# Patient Record
Sex: FEMALE | Race: BLACK OR AFRICAN AMERICAN | URBAN - METROPOLITAN AREA
[De-identification: names, ages, dates, MRNs, and addresses within clinical notes are randomized per-mention and may not be internally consistent; named-entity substitution may affect disease eponyms.]

---

## 2021-07-16 ENCOUNTER — NEW PATIENT COMPREHENSIVE (OUTPATIENT)
Dept: URBAN - METROPOLITAN AREA CLINIC 50 | Facility: CLINIC | Age: 66
End: 2021-07-16

## 2021-07-16 DIAGNOSIS — H25.13: ICD-10-CM

## 2021-07-16 DIAGNOSIS — H35.373: ICD-10-CM

## 2021-07-16 DIAGNOSIS — H40.1131: ICD-10-CM

## 2021-07-16 PROCEDURE — 76514 ECHO EXAM OF EYE THICKNESS: CPT

## 2021-07-16 PROCEDURE — 92004 COMPRE OPH EXAM NEW PT 1/>: CPT

## 2021-07-16 PROCEDURE — 92134 CPTRZ OPH DX IMG PST SGM RTA: CPT

## 2021-07-16 ASSESSMENT — VISUAL ACUITY
OS_CC: 20/40
OD_GLARE: 20/80
OD_CC: 20/50
OS_GLARE: 20/80
OD_PH: 20/30
OD_GLARE: 20/40
OS_SC: 20/100
OS_GLARE: 20/40
OU_CC: J1
OS_PH: 20/25
OD_SC: 20/50

## 2021-07-16 ASSESSMENT — KERATOMETRY
OS_AXISANGLE2_DEGREES: 153
OS_K2POWER_DIOPTERS: 45.75
OD_K2POWER_DIOPTERS: 45.50
OD_K1POWER_DIOPTERS: 44.75
OD_AXISANGLE_DEGREES: 99
OD_AXISANGLE2_DEGREES: 009
OS_K1POWER_DIOPTERS: 44.00
OS_AXISANGLE_DEGREES: 063

## 2021-07-16 ASSESSMENT — PACHYMETRY
OS_CT_UM: 588
OD_CT_UM: 589

## 2021-07-16 ASSESSMENT — TONOMETRY
OD_IOP_MMHG: 19
OD_IOP_MMHG: 22
OS_IOP_MMHG: 23
OS_IOP_MMHG: 20

## 2021-07-16 NOTE — PATIENT DISCUSSION
Iop in normal range,  recommend getting records from previous ophthalmologist.  once reviewed may need to add a drop.  but at this time will just observe.

## 2022-02-18 ENCOUNTER — ESTABLISHED PATIENT (OUTPATIENT)
Dept: URBAN - METROPOLITAN AREA CLINIC 50 | Facility: CLINIC | Age: 67
End: 2022-02-18

## 2022-02-18 DIAGNOSIS — H35.373: ICD-10-CM

## 2022-02-18 DIAGNOSIS — H25.13: ICD-10-CM

## 2022-02-18 DIAGNOSIS — H40.1131: ICD-10-CM

## 2022-02-18 DIAGNOSIS — E11.9: ICD-10-CM

## 2022-02-18 PROCEDURE — 92134 CPTRZ OPH DX IMG PST SGM RTA: CPT

## 2022-02-18 PROCEDURE — 92014 COMPRE OPH EXAM EST PT 1/>: CPT

## 2022-02-18 ASSESSMENT — VISUAL ACUITY
OD_GLARE: 20/40
OS_CC: 20/25-2
OS_GLARE: 20/25
OD_CC: 20/30
OS_GLARE: 20/40
OD_GLARE: 20/40
OD_PH: 20/25
OU_CC: J1+

## 2022-02-18 ASSESSMENT — KERATOMETRY
OD_K2POWER_DIOPTERS: 45.50
OD_AXISANGLE_DEGREES: 99
OS_AXISANGLE_DEGREES: 063
OD_K1POWER_DIOPTERS: 44.75
OD_AXISANGLE2_DEGREES: 009
OS_AXISANGLE2_DEGREES: 153
OS_K2POWER_DIOPTERS: 45.75
OS_K1POWER_DIOPTERS: 44.00

## 2022-02-18 ASSESSMENT — TONOMETRY
OS_IOP_MMHG: 18
OD_IOP_MMHG: 18
OS_IOP_MMHG: 22
OD_IOP_MMHG: 22

## 2022-02-18 NOTE — PATIENT DISCUSSION
We have not received the patients previous glaucoma records. Schedule HVF/RNFL OCT next available. Follow up in 2 months.

## 2022-07-09 ENCOUNTER — TELEPHONE ENCOUNTER (OUTPATIENT)
Dept: URBAN - METROPOLITAN AREA CLINIC 121 | Facility: CLINIC | Age: 67
End: 2022-07-09

## 2022-07-09 RX ORDER — CLARITHROMYCIN 500 MG/1
TABLET ORAL TWICE A DAY
Refills: 0 | OUTPATIENT
Start: 2011-03-08 | End: 2018-04-03

## 2022-07-09 RX ORDER — IBUPROFEN 200 MG
TABLET ORAL
Refills: 0 | OUTPATIENT
Start: 2018-04-03 | End: 2018-05-03

## 2022-07-09 RX ORDER — MELOXICAM 7.5 MG/1
TABLET ORAL ONCE A DAY
Refills: 0 | OUTPATIENT
Start: 2017-05-10 | End: 2017-05-10

## 2022-07-09 RX ORDER — HYDROCORTISONE ACETATE 25 MG/1
INSERT ONE SUPPOSITORY PER RECTUM TWICE DAILY AS NEEDED SUPPOSITORY RECTAL TWICE A DAY
Refills: 1 | OUTPATIENT
Start: 2011-03-21 | End: 2018-04-03

## 2022-07-09 RX ORDER — CHOLECALCIFEROL (VITAMIN D3) 10(400)/ML
DROPS ORAL THREE TIMES A DAY
Refills: 0 | OUTPATIENT
Start: 2018-05-03 | End: 2019-04-09

## 2022-07-09 RX ORDER — BISMUTH SUBCITRATE POTASSIUM, METRONIDAZOLE, TETRACYCLINE HYDROCHLORIDE 140; 125; 125 MG/1; MG/1; MG/1
TAKE UNTIL GONE .TAKE WITH OMEPRAZOLE 20MG TWICE DAILY CAPSULE ORAL
Refills: 0 | OUTPATIENT
Start: 2011-05-27 | End: 2018-04-03

## 2022-07-09 RX ORDER — ASPIRIN 81 MG/1
TABLET, COATED ORAL
Refills: 0 | OUTPATIENT
Start: 2010-03-19 | End: 2010-11-16

## 2022-07-09 RX ORDER — NAPROXEN SODIUM 550 MG/1
TABLET ORAL
Refills: 0 | OUTPATIENT
Start: 2010-11-16 | End: 2011-03-21

## 2022-07-09 RX ORDER — CALCIUM NO.38/D3/MAG/BORON ASP 500MG/15ML
LIQUID (ML) ORAL
Refills: 0 | OUTPATIENT
Start: 2011-03-21 | End: 2017-05-10

## 2022-07-09 RX ORDER — SUCRALFATE 1 G/10ML
SUSPENSION ORAL
Refills: 0 | OUTPATIENT
Start: 2017-05-10 | End: 2017-05-10

## 2022-07-09 RX ORDER — SUCRALFATE 1 G/1
TABLET ORAL
Refills: 11 | OUTPATIENT
Start: 2012-06-05 | End: 2012-06-05

## 2022-07-09 RX ORDER — METOPROLOL SUCCINATE 25 MG/1
TABLET, EXTENDED RELEASE ORAL ONCE A DAY
Refills: 0 | OUTPATIENT
Start: 2018-04-03 | End: 2018-05-03

## 2022-07-09 RX ORDER — TRAVOPROST 0.04 MG/ML
SOLUTION/ DROPS OPHTHALMIC ONCE A DAY
Refills: 0 | OUTPATIENT
Start: 2018-04-03 | End: 2018-05-03

## 2022-07-09 RX ORDER — FAMOTIDINE 20 MG/1
TABLET ORAL TWICE A DAY
Refills: 0 | OUTPATIENT
Start: 2012-06-18 | End: 2012-06-19

## 2022-07-09 RX ORDER — OMEPRAZOLE 20 MG/1
CAPSULE, DELAYED RELEASE ORAL TWICE A DAY
Refills: 3 | OUTPATIENT
Start: 2011-03-09 | End: 2011-06-24

## 2022-07-09 RX ORDER — TRAVOPROST 0.04 MG/ML
SOLUTION/ DROPS OPHTHALMIC ONCE A DAY
Refills: 0 | OUTPATIENT
Start: 2019-04-09 | End: 2019-05-21

## 2022-07-09 RX ORDER — SUCRALFATE 1 G/1
TABLET ORAL
Refills: 11 | OUTPATIENT
Start: 2012-06-19 | End: 2012-07-20

## 2022-07-09 RX ORDER — CHROMIUM 200 MCG
TABLET ORAL ONCE A DAY
Refills: 0 | OUTPATIENT
Start: 2018-05-03 | End: 2019-04-09

## 2022-07-09 RX ORDER — OMEGA-3S/DHA/EPA/FISH OIL 980-1400MG
CAPSULE,DELAYED RELEASE (ENTERIC COATED) ORAL ONCE A DAY
Refills: 0 | OUTPATIENT
Start: 2018-05-03 | End: 2019-04-09

## 2022-07-09 RX ORDER — ATORVASTATIN CALCIUM 40 MG/1
TABLET, FILM COATED ORAL
Refills: 0 | OUTPATIENT
Start: 2018-05-03 | End: 2019-04-09

## 2022-07-09 RX ORDER — OXYCODONE AND ACETAMINOPHEN 7.5; 325 MG/1; MG/1
TABLET ORAL
Refills: 0 | OUTPATIENT
Start: 2017-05-10 | End: 2017-05-10

## 2022-07-09 RX ORDER — METOPROLOL SUCCINATE 25 MG/1
TABLET, EXTENDED RELEASE ORAL
Refills: 0 | OUTPATIENT
Start: 2010-11-16 | End: 2011-03-21

## 2022-07-09 RX ORDER — CHROMIUM 200 MCG
TABLET ORAL ONCE A DAY
Refills: 0 | OUTPATIENT
Start: 2018-04-03 | End: 2018-05-03

## 2022-07-09 RX ORDER — ASPIRIN 81 MG/1
TABLET, COATED ORAL
Refills: 0 | OUTPATIENT
Start: 2010-11-16 | End: 2011-03-21

## 2022-07-09 RX ORDER — OMEGA-3S/DHA/EPA/FISH OIL 980-1400MG
CAPSULE,DELAYED RELEASE (ENTERIC COATED) ORAL ONCE A DAY
Refills: 0 | OUTPATIENT
Start: 2019-04-09 | End: 2019-05-21

## 2022-07-09 RX ORDER — CHOLECALCIFEROL (VITAMIN D3) 1250 MCG
CAPSULE ORAL
Refills: 0 | OUTPATIENT
Start: 2012-07-20 | End: 2017-05-10

## 2022-07-09 RX ORDER — NAPROXEN SODIUM 550 MG/1
TABLET ORAL
Refills: 0 | OUTPATIENT
Start: 2010-03-19 | End: 2010-11-16

## 2022-07-09 RX ORDER — CHROMIUM 200 MCG
TABLET ORAL ONCE A DAY
Refills: 0 | OUTPATIENT
Start: 2019-04-09 | End: 2019-05-21

## 2022-07-09 RX ORDER — BISMUTH SUBCITRATE POTASSIUM, METRONIDAZOLE, TETRACYCLINE HYDROCHLORIDE 140; 125; 125 MG/1; MG/1; MG/1
TAKE UNTIL GONE .TAKE WITH OMEPRAZOLE 20MG TWICE DAILY CAPSULE ORAL
Refills: 0 | OUTPATIENT
Start: 2011-05-27 | End: 2011-05-27

## 2022-07-09 RX ORDER — OMEGA-3S/DHA/EPA/FISH OIL 980-1400MG
CAPSULE,DELAYED RELEASE (ENTERIC COATED) ORAL ONCE A DAY
Refills: 0 | OUTPATIENT
Start: 2018-04-03 | End: 2018-05-03

## 2022-07-09 RX ORDER — OMEPRAZOLE 20 MG/1
CAPSULE, DELAYED RELEASE ORAL TWICE A DAY
Refills: 1 | OUTPATIENT
Start: 2011-01-14 | End: 2011-03-09

## 2022-07-09 RX ORDER — CHOLECALCIFEROL (VITAMIN D3) 10(400)/ML
DROPS ORAL THREE TIMES A DAY
Refills: 0 | OUTPATIENT
Start: 2018-04-03 | End: 2018-05-03

## 2022-07-09 RX ORDER — SUCRALFATE 1 G/1
TABLET ORAL
Refills: 11 | OUTPATIENT
Start: 2012-06-18 | End: 2012-06-05

## 2022-07-09 RX ORDER — OMEPRAZOLE 20 MG/1
CAPSULE, DELAYED RELEASE ORAL TWICE A DAY
Refills: 3 | OUTPATIENT
Start: 2011-05-27 | End: 2011-11-29

## 2022-07-09 RX ORDER — TETRACYCLINE HYDROCHLORIDE 500 MG/1
CAPSULE ORAL THREE TIMES A DAY
Refills: 0 | OUTPATIENT
Start: 2011-01-14 | End: 2018-04-03

## 2022-07-09 RX ORDER — CHOLECALCIFEROL (VITAMIN D3) 10(400)/ML
DROPS ORAL THREE TIMES A DAY
Refills: 0 | OUTPATIENT
Start: 2019-04-09 | End: 2019-05-21

## 2022-07-09 RX ORDER — MULTIVIT/IRON SULF/FOLIC ACID 15MG-0.4MG
TABLET ORAL
Refills: 0 | OUTPATIENT
Start: 2011-11-29 | End: 2017-05-10

## 2022-07-09 RX ORDER — FAMOTIDINE 20 MG/1
TABLET ORAL TWICE A DAY
Refills: 3 | OUTPATIENT
Start: 2012-08-21 | End: 2018-04-03

## 2022-07-09 RX ORDER — SUCRALFATE 1 G/10ML
SUSPENSION ORAL
Refills: 0 | OUTPATIENT
Start: 2017-05-10 | End: 2018-04-03

## 2022-07-09 RX ORDER — CLARITHROMYCIN 500 MG/1
TABLET ORAL TWICE A DAY
Refills: 0 | OUTPATIENT
Start: 2011-01-17 | End: 2011-03-08

## 2022-07-09 RX ORDER — FAMOTIDINE 20 MG/1
TABLET ORAL TWICE A DAY
Refills: 0 | OUTPATIENT
Start: 2012-06-19 | End: 2012-08-21

## 2022-07-09 RX ORDER — AMLODIPINE AND BENAZEPRIL HYDROCHLORIDE 10; 20 MG/1; MG/1
CAPSULE ORAL
Refills: 0 | OUTPATIENT
Start: 2010-03-19 | End: 2010-11-16

## 2022-07-09 RX ORDER — FAMOTIDINE 20 MG/1
TABLET ORAL
Refills: 0 | OUTPATIENT
Start: 2012-07-20 | End: 2017-05-10

## 2022-07-09 RX ORDER — AMLODIPINE BESYLATE 5 MG/1
TABLET ORAL ONCE A DAY
Refills: 0 | OUTPATIENT
Start: 2018-05-03 | End: 2019-04-09

## 2022-07-09 RX ORDER — FAMOTIDINE 20 MG/1
TWICE A DAY TABLET ORAL TWICE A DAY
Refills: 11 | OUTPATIENT
Start: 2018-06-15 | End: 2019-04-09

## 2022-07-09 RX ORDER — NAPROXEN SODIUM 220 MG
TABLET ORAL ONCE A DAY
Refills: 0 | OUTPATIENT
Start: 2017-05-10 | End: 2018-04-03

## 2022-07-09 RX ORDER — FAMOTIDINE 20 MG/1
TABLET ORAL TWICE A DAY
Refills: 0 | OUTPATIENT
Start: 2012-06-18 | End: 2012-06-18

## 2022-07-09 RX ORDER — METOPROLOL SUCCINATE 25 MG/1
TABLET, EXTENDED RELEASE ORAL ONCE A DAY
Refills: 0 | OUTPATIENT
Start: 2018-05-03 | End: 2018-05-03

## 2022-07-09 RX ORDER — TRAVOPROST 0.04 MG/ML
SOLUTION/ DROPS OPHTHALMIC ONCE A DAY
Refills: 0 | OUTPATIENT
Start: 2018-05-03 | End: 2019-04-09

## 2022-07-10 ENCOUNTER — TELEPHONE ENCOUNTER (OUTPATIENT)
Dept: URBAN - METROPOLITAN AREA CLINIC 121 | Facility: CLINIC | Age: 67
End: 2022-07-10

## 2022-07-10 RX ORDER — OMEGA-3S/DHA/EPA/FISH OIL 980-1400MG
CAPSULE,DELAYED RELEASE (ENTERIC COATED) ORAL ONCE A DAY
Refills: 0 | Status: ACTIVE | COMMUNITY
Start: 2019-05-21

## 2022-07-10 RX ORDER — FAMOTIDINE 40 MG/1
TWICE A DAY TABLET, FILM COATED ORAL TWICE A DAY
Refills: 11 | Status: ACTIVE | COMMUNITY
Start: 2018-04-03

## 2022-07-10 RX ORDER — CHROMIUM 200 MCG
TABLET ORAL ONCE A DAY
Refills: 0 | Status: ACTIVE | COMMUNITY
Start: 2019-05-21

## 2022-07-10 RX ORDER — TRAVOPROST 0.04 MG/ML
SOLUTION/ DROPS OPHTHALMIC ONCE A DAY
Refills: 0 | Status: ACTIVE | COMMUNITY
Start: 2019-05-21

## 2022-07-10 RX ORDER — CHOLECALCIFEROL (VITAMIN D3) 10(400)/ML
DROPS ORAL THREE TIMES A DAY
Refills: 0 | Status: ACTIVE | COMMUNITY
Start: 2019-05-21

## 2022-07-10 RX ORDER — FAMOTIDINE 10 MG
TWICE A DAY TABLET ORAL TWICE A DAY
Refills: 11 | Status: ACTIVE | COMMUNITY
Start: 2019-04-09

## 2022-07-15 ENCOUNTER — FOLLOW UP (OUTPATIENT)
Dept: URBAN - METROPOLITAN AREA CLINIC 50 | Facility: CLINIC | Age: 67
End: 2022-07-15

## 2022-07-15 DIAGNOSIS — H25.13: ICD-10-CM

## 2022-07-15 DIAGNOSIS — H40.1111: ICD-10-CM

## 2022-07-15 DIAGNOSIS — H40.1122: ICD-10-CM

## 2022-07-15 PROBLEM — Z98.890: Noted: 2022-07-15

## 2022-07-15 PROCEDURE — 92012 INTRM OPH EXAM EST PATIENT: CPT

## 2022-07-15 PROCEDURE — 92083 EXTENDED VISUAL FIELD XM: CPT

## 2022-07-15 PROCEDURE — 65855 TRABECULOPLASTY LASER SURG: CPT

## 2022-07-15 PROCEDURE — 92133 CPTRZD OPH DX IMG PST SGM ON: CPT

## 2022-07-15 RX ORDER — LATANOPROST 50 UG/ML
1 SOLUTION/ DROPS OPHTHALMIC EVERY EVENING
Start: 2022-07-15

## 2022-07-15 ASSESSMENT — TONOMETRY
OS_IOP_MMHG: 28
OS_IOP_MMHG: 25
OD_IOP_MMHG: 23
OS_IOP_MMHG: 21
OS_IOP_MMHG: 18
OD_IOP_MMHG: 26

## 2022-07-15 ASSESSMENT — VISUAL ACUITY
OU_CC: 20/25
OD_CC: 20/25-2
OS_CC: 20/20

## 2022-07-15 NOTE — PROCEDURE NOTE: CLINICAL
PROCEDURE NOTE: SLT OS. Diagnosis: POAG, Moderate. Anesthesia: Topical. Prior to treatment, the risks/benefits/alternatives were discussed. The patient wished to proceed with procedure. Alphagan, Proparacaine and Pilocarpine given pre laser. The patient was seated at the laser and the Selective Laser Trabecculoplasty (SLT) gonio lens was placed on the ocular surface with protective lubricant to protect the ocular surface. Power: 0.8mJ. Pulses: 28.  Patient did not tolerate procedure well. There were no complications. Post Laser IOP: *. Post procedure instructions given. Glenn Montgomery

## 2022-07-15 NOTE — PATIENT DISCUSSION
SLT partially performed today OS but was not completed. Patient unable to tolerate and complete SLT due to pain. Total of 28 pulses OS today.

## 2022-07-18 ENCOUNTER — EMERGENCY VISIT (OUTPATIENT)
Dept: URBAN - METROPOLITAN AREA CLINIC 52 | Facility: CLINIC | Age: 67
End: 2022-07-18

## 2022-07-18 DIAGNOSIS — Z98.890: ICD-10-CM

## 2022-07-18 PROCEDURE — 99024 POSTOP FOLLOW-UP VISIT: CPT

## 2022-07-18 RX ORDER — FLUOROMETHOLONE ACETATE 1 MG/ML
1 SUSPENSION/ DROPS OPHTHALMIC
Start: 2022-07-18 | End: 2022-07-21

## 2022-07-18 ASSESSMENT — TONOMETRY
OD_IOP_MMHG: 25
OD_IOP_MMHG: 24
OS_IOP_MMHG: 24
OS_IOP_MMHG: 22

## 2022-07-18 ASSESSMENT — VISUAL ACUITY
OU_CC: 20/20
OD_CC: 20/25
OS_CC: 20/20

## 2022-07-18 NOTE — PATIENT DISCUSSION
Patient had pain while using Latanoprost. Will make changes to drop therapy. Start Timolol OU BID. Follow up in 2 weeks for IOP check.

## 2022-07-18 NOTE — PATIENT DISCUSSION
Do not recommend SLT OD due to pain during SLT OS. Patient had pain while using Latanoprost. Will make changes to drop therapy. Start Timolol OU BID. Follow up in 2 weeks for IOP check.

## 2022-07-29 ENCOUNTER — FOLLOW UP (OUTPATIENT)
Dept: URBAN - METROPOLITAN AREA CLINIC 50 | Facility: CLINIC | Age: 67
End: 2022-07-29

## 2022-07-29 DIAGNOSIS — H40.1122: ICD-10-CM

## 2022-07-29 DIAGNOSIS — Z98.890: ICD-10-CM

## 2022-07-29 DIAGNOSIS — H40.1111: ICD-10-CM

## 2022-07-29 PROCEDURE — 92012 INTRM OPH EXAM EST PATIENT: CPT

## 2022-07-29 ASSESSMENT — TONOMETRY
OD_IOP_MMHG: 17
OD_IOP_MMHG: 14
OS_IOP_MMHG: 17
OS_IOP_MMHG: 14

## 2022-07-29 ASSESSMENT — VISUAL ACUITY
OS_CC: 20/20
OD_CC: 20/30-1
OU_CC: 20/20-1

## 2022-07-29 NOTE — PATIENT DISCUSSION
Patient had pain while using Latanoprost and was switched to Timolol at last visit. Decreased IOP OU. Continue Timolol OU BID. Follow up in 6 months for dilated exam. Will consider extending to yearly visits after next visit.

## 2022-09-07 ENCOUNTER — FOLLOW UP (OUTPATIENT)
Dept: URBAN - METROPOLITAN AREA CLINIC 49 | Facility: CLINIC | Age: 67
End: 2022-09-07

## 2022-09-07 DIAGNOSIS — H40.1111: ICD-10-CM

## 2022-09-07 DIAGNOSIS — H40.1122: ICD-10-CM

## 2022-09-07 PROCEDURE — 92012 INTRM OPH EXAM EST PATIENT: CPT

## 2022-09-07 RX ORDER — BRIMONIDINE TARTRATE 2 MG/MG
1 SOLUTION/ DROPS OPHTHALMIC TWICE A DAY
Start: 2022-09-07

## 2022-09-07 ASSESSMENT — TONOMETRY
OS_IOP_MMHG: 19
OS_IOP_MMHG: 16
OD_IOP_MMHG: 19
OD_IOP_MMHG: 16

## 2022-09-07 ASSESSMENT — VISUAL ACUITY
OD_CC: 20/30
OS_CC: 20/25

## 2022-09-07 NOTE — PATIENT DISCUSSION
IOP stable OU. Patient experiencing a dull headache with Timolol use. Will make changes o drop therapy. Discontinue Timolol. Start Brimonidine OU BID. Follow up in 1 month for IOP check.

## 2022-11-18 ENCOUNTER — FOLLOW UP (OUTPATIENT)
Dept: URBAN - METROPOLITAN AREA CLINIC 50 | Facility: CLINIC | Age: 67
End: 2022-11-18

## 2022-11-18 DIAGNOSIS — H40.1111: ICD-10-CM

## 2022-11-18 DIAGNOSIS — H40.1122: ICD-10-CM

## 2022-11-18 DIAGNOSIS — J32.2: ICD-10-CM

## 2022-11-18 PROCEDURE — 92012 INTRM OPH EXAM EST PATIENT: CPT

## 2022-11-18 ASSESSMENT — TONOMETRY
OS_IOP_MMHG: 21
OS_IOP_MMHG: 24
OD_IOP_MMHG: 22
OD_IOP_MMHG: 19

## 2022-11-18 ASSESSMENT — VISUAL ACUITY
OD_CC: 20/25-2 PUSH
OD_GLARE: 20/30
OS_GLARE: 20/25
OS_CC: 20/25-1
OS_GLARE: 20/25
OD_GLARE: 20/40

## 2022-11-18 NOTE — PATIENT DISCUSSION
IOP elevated today 22/24 adjust to 19/21. Patient experiencing discomfort with Brimonidine. Will monitor patient at this time with no drop therapy.

## 2023-06-06 NOTE — PATIENT DISCUSSION
Patient has used Latanoprost in the past but discontinued due to pain. [employer] Counseling:  I discussed with the patient the risks of mycophenolate mofetil including but not limited to infection/immunosuppression, GI upset, hypokalemia, hypercholesterolemia, bone marrow suppression, lymphoproliferative disorders, malignancy, GI ulceration/bleed/perforation, colitis, interstitial lung disease, kidney failure, progressive multifocal leukoencephalopathy, and birth defects.  The patient understands that monitoring is required including a baseline creatinine and regular CBC testing. In addition, patient must alert us immediately if symptoms of infection or other concerning signs are noted.